# Patient Record
Sex: MALE | Race: BLACK OR AFRICAN AMERICAN | NOT HISPANIC OR LATINO
[De-identification: names, ages, dates, MRNs, and addresses within clinical notes are randomized per-mention and may not be internally consistent; named-entity substitution may affect disease eponyms.]

---

## 2017-02-13 PROBLEM — Z00.00 ENCOUNTER FOR PREVENTIVE HEALTH EXAMINATION: Status: ACTIVE | Noted: 2017-02-13

## 2017-03-02 ENCOUNTER — APPOINTMENT (OUTPATIENT)
Dept: ENDOCRINOLOGY | Facility: CLINIC | Age: 62
End: 2017-03-02

## 2017-03-02 PROBLEM — Z00.00 ENCOUNTER FOR PREVENTIVE HEALTH EXAMINATION: Status: ACTIVE | Noted: 2017-03-02

## 2017-03-06 ENCOUNTER — APPOINTMENT (OUTPATIENT)
Age: 62
End: 2017-03-06

## 2017-03-07 ENCOUNTER — APPOINTMENT (OUTPATIENT)
Dept: NEPHROLOGY | Facility: CLINIC | Age: 62
End: 2017-03-07

## 2017-03-07 VITALS
HEART RATE: 56 BPM | WEIGHT: 187 LBS | HEIGHT: 70 IN | SYSTOLIC BLOOD PRESSURE: 98 MMHG | BODY MASS INDEX: 26.77 KG/M2 | DIASTOLIC BLOOD PRESSURE: 46 MMHG

## 2017-03-21 ENCOUNTER — APPOINTMENT (OUTPATIENT)
Dept: INTERNAL MEDICINE | Facility: CLINIC | Age: 62
End: 2017-03-21

## 2017-04-11 ENCOUNTER — OUTPATIENT (OUTPATIENT)
Dept: OUTPATIENT SERVICES | Facility: HOSPITAL | Age: 62
LOS: 1 days | Discharge: HOME | End: 2017-04-11

## 2017-05-18 ENCOUNTER — APPOINTMENT (OUTPATIENT)
Dept: PODIATRY | Facility: CLINIC | Age: 62
End: 2017-05-18

## 2017-05-19 ENCOUNTER — APPOINTMENT (OUTPATIENT)
Dept: INTERNAL MEDICINE | Facility: CLINIC | Age: 62
End: 2017-05-19

## 2017-06-07 ENCOUNTER — EMERGENCY (EMERGENCY)
Facility: HOSPITAL | Age: 62
LOS: 0 days | Discharge: HOME | End: 2017-06-07
Admitting: GENERAL PRACTICE

## 2017-06-07 DIAGNOSIS — I10 ESSENTIAL (PRIMARY) HYPERTENSION: ICD-10-CM

## 2017-06-08 ENCOUNTER — CHART COPY (OUTPATIENT)
Age: 62
End: 2017-06-08

## 2017-06-09 ENCOUNTER — INPATIENT (INPATIENT)
Facility: HOSPITAL | Age: 62
LOS: 4 days | Discharge: HOME | End: 2017-06-14
Attending: HOSPITALIST | Admitting: GENERAL PRACTICE

## 2017-06-09 ENCOUNTER — CHART COPY (OUTPATIENT)
Age: 62
End: 2017-06-09

## 2017-06-09 DIAGNOSIS — I16.0 HYPERTENSIVE URGENCY: ICD-10-CM

## 2017-06-09 DIAGNOSIS — N19 UNSPECIFIED KIDNEY FAILURE: ICD-10-CM

## 2017-06-09 DIAGNOSIS — R53.1 WEAKNESS: ICD-10-CM

## 2017-06-13 ENCOUNTER — APPOINTMENT (OUTPATIENT)
Dept: INTERNAL MEDICINE | Facility: CLINIC | Age: 62
End: 2017-06-13

## 2017-06-22 ENCOUNTER — INPATIENT (INPATIENT)
Facility: HOSPITAL | Age: 62
LOS: 0 days | Discharge: LEFT BEFORE DISCHARGE | End: 2017-06-23
Attending: HOSPITALIST | Admitting: GENERAL PRACTICE

## 2017-06-22 DIAGNOSIS — I16.0 HYPERTENSIVE URGENCY: ICD-10-CM

## 2017-06-22 DIAGNOSIS — R53.1 WEAKNESS: ICD-10-CM

## 2017-06-22 DIAGNOSIS — N19 UNSPECIFIED KIDNEY FAILURE: ICD-10-CM

## 2017-06-28 DIAGNOSIS — N18.6 END STAGE RENAL DISEASE: ICD-10-CM

## 2017-06-28 DIAGNOSIS — B19.20 UNSPECIFIED VIRAL HEPATITIS C WITHOUT HEPATIC COMA: ICD-10-CM

## 2017-06-28 DIAGNOSIS — E11.43 TYPE 2 DIABETES MELLITUS WITH DIABETIC AUTONOMIC (POLY)NEUROPATHY: ICD-10-CM

## 2017-06-28 DIAGNOSIS — T46.5X6A UNDERDOSING OF OTHER ANTIHYPERTENSIVE DRUGS, INITIAL ENCOUNTER: ICD-10-CM

## 2017-06-28 DIAGNOSIS — K31.84 GASTROPARESIS: ICD-10-CM

## 2017-06-28 DIAGNOSIS — Z99.2 DEPENDENCE ON RENAL DIALYSIS: ICD-10-CM

## 2017-06-28 DIAGNOSIS — E11.22 TYPE 2 DIABETES MELLITUS WITH DIABETIC CHRONIC KIDNEY DISEASE: ICD-10-CM

## 2017-06-28 DIAGNOSIS — I16.0 HYPERTENSIVE URGENCY: ICD-10-CM

## 2017-06-28 DIAGNOSIS — Z79.4 LONG TERM (CURRENT) USE OF INSULIN: ICD-10-CM

## 2017-06-28 DIAGNOSIS — E87.5 HYPERKALEMIA: ICD-10-CM

## 2017-06-28 DIAGNOSIS — D63.1 ANEMIA IN CHRONIC KIDNEY DISEASE: ICD-10-CM

## 2017-06-28 DIAGNOSIS — F17.200 NICOTINE DEPENDENCE, UNSPECIFIED, UNCOMPLICATED: ICD-10-CM

## 2017-06-28 DIAGNOSIS — R51 HEADACHE: ICD-10-CM

## 2017-06-28 DIAGNOSIS — B18.2 CHRONIC VIRAL HEPATITIS C: ICD-10-CM

## 2017-06-28 DIAGNOSIS — E11.65 TYPE 2 DIABETES MELLITUS WITH HYPERGLYCEMIA: ICD-10-CM

## 2017-06-28 DIAGNOSIS — I45.81 LONG QT SYNDROME: ICD-10-CM

## 2017-06-28 DIAGNOSIS — F11.20 OPIOID DEPENDENCE, UNCOMPLICATED: ICD-10-CM

## 2017-06-28 DIAGNOSIS — Z85.038 PERSONAL HISTORY OF OTHER MALIGNANT NEOPLASM OF LARGE INTESTINE: ICD-10-CM

## 2017-06-28 DIAGNOSIS — I12.0 HYPERTENSIVE CHRONIC KIDNEY DISEASE WITH STAGE 5 CHRONIC KIDNEY DISEASE OR END STAGE RENAL DISEASE: ICD-10-CM

## 2017-06-28 DIAGNOSIS — R06.02 SHORTNESS OF BREATH: ICD-10-CM

## 2017-06-28 DIAGNOSIS — E87.79 OTHER FLUID OVERLOAD: ICD-10-CM

## 2017-06-28 DIAGNOSIS — Z91.138 PATIENT'S UNINTENTIONAL UNDERDOSING OF MEDICATION REGIMEN FOR OTHER REASON: ICD-10-CM

## 2017-07-03 DIAGNOSIS — E87.5 HYPERKALEMIA: ICD-10-CM

## 2017-08-03 ENCOUNTER — OUTPATIENT (OUTPATIENT)
Dept: OUTPATIENT SERVICES | Facility: HOSPITAL | Age: 62
LOS: 1 days | Discharge: HOME | End: 2017-08-03

## 2017-08-03 DIAGNOSIS — R53.1 WEAKNESS: ICD-10-CM

## 2017-08-03 DIAGNOSIS — Z00.8 ENCOUNTER FOR OTHER GENERAL EXAMINATION: ICD-10-CM

## 2017-08-03 DIAGNOSIS — I49.9 CARDIAC ARRHYTHMIA, UNSPECIFIED: ICD-10-CM

## 2017-08-03 DIAGNOSIS — N19 UNSPECIFIED KIDNEY FAILURE: ICD-10-CM

## 2017-08-03 DIAGNOSIS — I16.0 HYPERTENSIVE URGENCY: ICD-10-CM

## 2017-08-04 ENCOUNTER — APPOINTMENT (OUTPATIENT)
Dept: INTERNAL MEDICINE | Facility: CLINIC | Age: 62
End: 2017-08-04

## 2017-08-28 DIAGNOSIS — Z01.818 ENCOUNTER FOR OTHER PREPROCEDURAL EXAMINATION: ICD-10-CM

## 2017-08-28 DIAGNOSIS — N18.6 END STAGE RENAL DISEASE: ICD-10-CM

## 2017-09-21 ENCOUNTER — OUTPATIENT (OUTPATIENT)
Dept: OUTPATIENT SERVICES | Facility: HOSPITAL | Age: 62
LOS: 1 days | Discharge: HOME | End: 2017-09-21

## 2017-09-21 DIAGNOSIS — N19 UNSPECIFIED KIDNEY FAILURE: ICD-10-CM

## 2017-09-21 DIAGNOSIS — R53.1 WEAKNESS: ICD-10-CM

## 2017-09-21 DIAGNOSIS — I16.0 HYPERTENSIVE URGENCY: ICD-10-CM

## 2017-09-28 DIAGNOSIS — R51 HEADACHE: ICD-10-CM

## 2018-01-02 ENCOUNTER — OUTPATIENT (OUTPATIENT)
Dept: OUTPATIENT SERVICES | Facility: HOSPITAL | Age: 63
LOS: 1 days | Discharge: HOME | End: 2018-01-02

## 2018-01-02 DIAGNOSIS — N18.6 END STAGE RENAL DISEASE: ICD-10-CM

## 2018-01-02 DIAGNOSIS — N19 UNSPECIFIED KIDNEY FAILURE: ICD-10-CM

## 2018-01-02 DIAGNOSIS — I16.0 HYPERTENSIVE URGENCY: ICD-10-CM

## 2018-01-02 DIAGNOSIS — R53.1 WEAKNESS: ICD-10-CM

## 2018-01-16 ENCOUNTER — OUTPATIENT (OUTPATIENT)
Dept: OUTPATIENT SERVICES | Facility: HOSPITAL | Age: 63
LOS: 1 days | Discharge: HOME | End: 2018-01-16

## 2018-01-16 DIAGNOSIS — I16.0 HYPERTENSIVE URGENCY: ICD-10-CM

## 2018-01-16 DIAGNOSIS — N18.6 END STAGE RENAL DISEASE: ICD-10-CM

## 2018-01-16 DIAGNOSIS — R53.1 WEAKNESS: ICD-10-CM

## 2018-01-16 DIAGNOSIS — N19 UNSPECIFIED KIDNEY FAILURE: ICD-10-CM

## 2018-01-18 ENCOUNTER — OUTPATIENT (OUTPATIENT)
Dept: OUTPATIENT SERVICES | Facility: HOSPITAL | Age: 63
LOS: 1 days | Discharge: HOME | End: 2018-01-18

## 2018-01-18 DIAGNOSIS — R53.1 WEAKNESS: ICD-10-CM

## 2018-01-18 DIAGNOSIS — I16.0 HYPERTENSIVE URGENCY: ICD-10-CM

## 2018-01-18 DIAGNOSIS — N19 UNSPECIFIED KIDNEY FAILURE: ICD-10-CM

## 2018-01-19 DIAGNOSIS — D64.9 ANEMIA, UNSPECIFIED: ICD-10-CM

## 2018-01-19 DIAGNOSIS — D63.1 ANEMIA IN CHRONIC KIDNEY DISEASE: ICD-10-CM

## 2018-02-12 DIAGNOSIS — Z87.891 PERSONAL HISTORY OF NICOTINE DEPENDENCE: ICD-10-CM

## 2018-02-12 DIAGNOSIS — N18.6 END STAGE RENAL DISEASE: ICD-10-CM

## 2018-02-12 DIAGNOSIS — Z79.899 OTHER LONG TERM (CURRENT) DRUG THERAPY: ICD-10-CM

## 2018-02-12 DIAGNOSIS — I12.0 HYPERTENSIVE CHRONIC KIDNEY DISEASE WITH STAGE 5 CHRONIC KIDNEY DISEASE OR END STAGE RENAL DISEASE: ICD-10-CM

## 2018-02-12 DIAGNOSIS — Z79.84 LONG TERM (CURRENT) USE OF ORAL HYPOGLYCEMIC DRUGS: ICD-10-CM

## 2018-02-12 DIAGNOSIS — Z88.0 ALLERGY STATUS TO PENICILLIN: ICD-10-CM

## 2018-02-12 DIAGNOSIS — Z99.2 DEPENDENCE ON RENAL DIALYSIS: ICD-10-CM

## 2018-02-12 DIAGNOSIS — R11.10 VOMITING, UNSPECIFIED: ICD-10-CM

## 2018-02-12 DIAGNOSIS — I10 ESSENTIAL (PRIMARY) HYPERTENSION: ICD-10-CM

## 2018-03-27 ENCOUNTER — OUTPATIENT (OUTPATIENT)
Dept: OUTPATIENT SERVICES | Facility: HOSPITAL | Age: 63
LOS: 1 days | Discharge: HOME | End: 2018-03-27

## 2018-03-27 DIAGNOSIS — D64.9 ANEMIA, UNSPECIFIED: ICD-10-CM

## 2018-05-30 ENCOUNTER — LABORATORY RESULT (OUTPATIENT)
Age: 63
End: 2018-05-30

## 2018-05-30 ENCOUNTER — APPOINTMENT (OUTPATIENT)
Dept: HEMATOLOGY ONCOLOGY | Facility: CLINIC | Age: 63
End: 2018-05-30

## 2018-05-30 VITALS
HEART RATE: 58 BPM | RESPIRATION RATE: 14 BRPM | SYSTOLIC BLOOD PRESSURE: 140 MMHG | DIASTOLIC BLOOD PRESSURE: 82 MMHG | HEIGHT: 70 IN | TEMPERATURE: 97.6 F

## 2018-05-30 DIAGNOSIS — Z99.2 END STAGE RENAL DISEASE: ICD-10-CM

## 2018-05-30 DIAGNOSIS — E78.5 HYPERLIPIDEMIA, UNSPECIFIED: ICD-10-CM

## 2018-05-30 DIAGNOSIS — F17.200 NICOTINE DEPENDENCE, UNSPECIFIED, UNCOMPLICATED: ICD-10-CM

## 2018-05-30 DIAGNOSIS — N18.6 END STAGE RENAL DISEASE: ICD-10-CM

## 2018-05-30 DIAGNOSIS — E11.9 TYPE 2 DIABETES MELLITUS W/OUT COMPLICATIONS: ICD-10-CM

## 2018-05-30 DIAGNOSIS — D64.9 ANEMIA, UNSPECIFIED: ICD-10-CM

## 2018-05-30 DIAGNOSIS — I10 ESSENTIAL (PRIMARY) HYPERTENSION: ICD-10-CM

## 2018-05-31 LAB
ALBUMIN SERPL ELPH-MCNC: 2.9 G/DL
ALP BLD-CCNC: 53 U/L
ALT SERPL-CCNC: 11 U/L
ANION GAP SERPL CALC-SCNC: 13 MMOL/L
AST SERPL-CCNC: 19 U/L
BILIRUB SERPL-MCNC: 0.3 MG/DL
BUN SERPL-MCNC: 27 MG/DL
CALCIUM SERPL-MCNC: 8.1 MG/DL
CEA SERPL-MCNC: 4.4 NG/ML
CHLORIDE SERPL-SCNC: 97 MMOL/L
CO2 SERPL-SCNC: 27 MMOL/L
CREAT SERPL-MCNC: 3.1 MG/DL
FERRITIN SERPL-MCNC: 1352 NG/ML
FOLATE SERPL-MCNC: 4.2 NG/ML
GLUCOSE SERPL-MCNC: 73 MG/DL
HAPTOGLOB SERPL-MCNC: 158 MG/DL
HCT VFR BLD CALC: 24.8 %
HGB BLD-MCNC: 7.4 G/DL
IRON SATN MFR SERPL: 21 %
IRON SERPL-MCNC: 32 UG/DL
LDH SERPL-CCNC: 233 U/L
MCHC RBC-ENTMCNC: 27.6 PG
MCHC RBC-ENTMCNC: 29.8 G/DL
MCV RBC AUTO: 92.5 FL
PLATELET # BLD AUTO: 195 K/UL
PMV BLD: 10 FL
POTASSIUM SERPL-SCNC: 4.5 MMOL/L
PROT SERPL-MCNC: 7.7 G/DL
RBC # BLD: 2.68 M/UL
RBC # FLD: 17.2 %
SODIUM SERPL-SCNC: 137 MMOL/L
TIBC SERPL-MCNC: 151 UG/DL
UIBC SERPL-MCNC: 119 UG/DL
VIT B12 SERPL-MCNC: 315 PG/ML
WBC # FLD AUTO: 6.9 K/UL

## 2018-06-04 PROBLEM — I10 HTN (HYPERTENSION): Status: ACTIVE | Noted: 2018-06-04

## 2018-06-04 PROBLEM — E78.5 DYSLIPIDEMIA: Status: ACTIVE | Noted: 2018-06-04

## 2018-06-04 PROBLEM — N18.6 END-STAGE RENAL DISEASE ON HEMODIALYSIS: Status: ACTIVE | Noted: 2018-06-04

## 2018-06-04 PROBLEM — E11.9 DIABETES MELLITUS: Status: ACTIVE | Noted: 2018-06-04

## 2018-06-04 RX ORDER — CHOLESTYRAMINE 4 G/9G
4 POWDER, FOR SUSPENSION ORAL
Refills: 0 | Status: ACTIVE | COMMUNITY

## 2018-06-04 RX ORDER — IPRATROPIUM BROMIDE AND ALBUTEROL SULFATE 2.5; .5 MG/3ML; MG/3ML
0.5-2.5 (3) SOLUTION RESPIRATORY (INHALATION)
Refills: 0 | Status: ACTIVE | COMMUNITY

## 2018-06-04 RX ORDER — OXYCODONE 5 MG/1
5 TABLET ORAL
Refills: 0 | Status: ACTIVE | COMMUNITY

## 2018-06-04 RX ORDER — CALCIUM ACETATE 667 MG/1
667 CAPSULE ORAL
Refills: 0 | Status: ACTIVE | COMMUNITY

## 2018-06-04 RX ORDER — DEXTROMETHORPHAN HBR AND GUAIFENESIN 10; 100 MG/5ML; MG/5ML
10-100 SOLUTION ORAL
Refills: 0 | Status: ACTIVE | COMMUNITY

## 2018-06-04 RX ORDER — LABETALOL HYDROCHLORIDE 200 MG/1
200 TABLET, FILM COATED ORAL
Refills: 0 | Status: ACTIVE | COMMUNITY

## 2018-06-04 RX ORDER — ACETAMINOPHEN 325 MG/1
325 TABLET ORAL
Refills: 0 | Status: ACTIVE | COMMUNITY

## 2018-06-04 RX ORDER — LACTULOSE 10 G/15ML
10 SOLUTION ORAL
Refills: 0 | Status: ACTIVE | COMMUNITY

## 2018-06-04 RX ORDER — ASPIRIN 81 MG/1
81 TABLET, COATED ORAL
Refills: 0 | Status: ACTIVE | COMMUNITY

## 2018-06-04 RX ORDER — METHADONE HYDROCHLORIDE 5 MG/5ML
5 SOLUTION ORAL
Refills: 0 | Status: ACTIVE | COMMUNITY

## 2018-06-04 RX ORDER — TAMSULOSIN HYDROCHLORIDE 0.4 MG/1
0.4 CAPSULE ORAL
Refills: 0 | Status: ACTIVE | COMMUNITY

## 2018-06-04 RX ORDER — HYDRALAZINE HYDROCHLORIDE 100 MG/1
100 TABLET ORAL
Refills: 0 | Status: ACTIVE | COMMUNITY

## 2018-06-04 RX ORDER — NYSTATIN 100000 [USP'U]/G
100000 CREAM TOPICAL
Refills: 0 | Status: ACTIVE | COMMUNITY

## 2018-06-04 RX ORDER — LISINOPRIL 20 MG/1
20 TABLET ORAL
Refills: 0 | Status: ACTIVE | COMMUNITY

## 2018-06-04 RX ORDER — FINASTERIDE 1 MG/1
1 TABLET ORAL
Refills: 0 | Status: ACTIVE | COMMUNITY

## 2018-06-08 LAB
EPO SERPL-MCNC: 632.4 MIU/ML
METHYLMALONATE SERPL-SCNC: 650 NMOL/L

## 2018-06-11 ENCOUNTER — OUTPATIENT (OUTPATIENT)
Dept: OUTPATIENT SERVICES | Facility: HOSPITAL | Age: 63
LOS: 1 days | Discharge: HOME | End: 2018-06-11

## 2018-06-11 DIAGNOSIS — A49.8 OTHER BACTERIAL INFECTIONS OF UNSPECIFIED SITE: ICD-10-CM

## 2018-06-25 ENCOUNTER — OUTPATIENT (OUTPATIENT)
Dept: OUTPATIENT SERVICES | Facility: HOSPITAL | Age: 63
LOS: 1 days | Discharge: HOME | End: 2018-06-25

## 2018-06-25 DIAGNOSIS — A49.8 OTHER BACTERIAL INFECTIONS OF UNSPECIFIED SITE: ICD-10-CM

## 2018-07-09 ENCOUNTER — OUTPATIENT (OUTPATIENT)
Dept: OUTPATIENT SERVICES | Facility: HOSPITAL | Age: 63
LOS: 1 days | Discharge: HOME | End: 2018-07-09

## 2018-07-09 DIAGNOSIS — A49.8 OTHER BACTERIAL INFECTIONS OF UNSPECIFIED SITE: ICD-10-CM

## 2018-07-13 ENCOUNTER — APPOINTMENT (OUTPATIENT)
Dept: HEMATOLOGY ONCOLOGY | Facility: CLINIC | Age: 63
End: 2018-07-13

## 2018-07-13 ENCOUNTER — LABORATORY RESULT (OUTPATIENT)
Age: 63
End: 2018-07-13

## 2018-07-13 VITALS
DIASTOLIC BLOOD PRESSURE: 62 MMHG | SYSTOLIC BLOOD PRESSURE: 132 MMHG | WEIGHT: 187 LBS | HEART RATE: 61 BPM | RESPIRATION RATE: 14 BRPM | BODY MASS INDEX: 26.77 KG/M2 | HEIGHT: 70 IN | TEMPERATURE: 98.6 F

## 2018-07-16 ENCOUNTER — OUTPATIENT (OUTPATIENT)
Dept: OUTPATIENT SERVICES | Facility: HOSPITAL | Age: 63
LOS: 1 days | Discharge: HOME | End: 2018-07-16

## 2018-07-16 ENCOUNTER — APPOINTMENT (OUTPATIENT)
Dept: INFUSION THERAPY | Facility: CLINIC | Age: 63
End: 2018-07-16

## 2018-07-16 ENCOUNTER — LABORATORY RESULT (OUTPATIENT)
Age: 63
End: 2018-07-16

## 2018-07-16 DIAGNOSIS — D64.9 ANEMIA, UNSPECIFIED: ICD-10-CM

## 2018-07-19 LAB
HCT VFR BLD CALC: 28.2 %
HCT VFR BLD CALC: 29.4 %
HGB BLD-MCNC: 8.5 G/DL
HGB BLD-MCNC: 8.9 G/DL
MCHC RBC-ENTMCNC: 28.2 PG
MCHC RBC-ENTMCNC: 28.5 PG
MCHC RBC-ENTMCNC: 30.1 G/DL
MCHC RBC-ENTMCNC: 30.3 G/DL
MCV RBC AUTO: 93.7 FL
MCV RBC AUTO: 94.2 FL
PLATELET # BLD AUTO: 196 K/UL
PLATELET # BLD AUTO: 197 K/UL
PMV BLD: 10.3 FL
PMV BLD: 9.2 FL
RBC # BLD: 3.01 M/UL
RBC # BLD: 3.12 M/UL
RBC # FLD: 15.8 %
RBC # FLD: 16.3 %
WBC # FLD AUTO: 6.1 K/UL
WBC # FLD AUTO: 6.24 K/UL

## 2018-07-23 ENCOUNTER — OUTPATIENT (OUTPATIENT)
Dept: OUTPATIENT SERVICES | Facility: HOSPITAL | Age: 63
LOS: 1 days | Discharge: HOME | End: 2018-07-23

## 2018-07-23 ENCOUNTER — APPOINTMENT (OUTPATIENT)
Dept: HEMATOLOGY ONCOLOGY | Facility: CLINIC | Age: 63
End: 2018-07-23

## 2018-07-23 DIAGNOSIS — A49.8 OTHER BACTERIAL INFECTIONS OF UNSPECIFIED SITE: ICD-10-CM

## 2018-07-28 ENCOUNTER — EMERGENCY (EMERGENCY)
Facility: HOSPITAL | Age: 63
LOS: 0 days | Discharge: NURSING, REHAB OR LONG-TERM CARE | End: 2018-07-28
Attending: EMERGENCY MEDICINE | Admitting: EMERGENCY MEDICINE

## 2018-07-28 VITALS
TEMPERATURE: 98 F | RESPIRATION RATE: 19 BRPM | DIASTOLIC BLOOD PRESSURE: 72 MMHG | HEIGHT: 70 IN | WEIGHT: 130.07 LBS | OXYGEN SATURATION: 98 % | SYSTOLIC BLOOD PRESSURE: 126 MMHG | HEART RATE: 60 BPM

## 2018-07-28 VITALS
OXYGEN SATURATION: 98 % | TEMPERATURE: 98 F | RESPIRATION RATE: 20 BRPM | SYSTOLIC BLOOD PRESSURE: 140 MMHG | DIASTOLIC BLOOD PRESSURE: 70 MMHG | HEART RATE: 68 BPM

## 2018-07-28 DIAGNOSIS — E11.9 TYPE 2 DIABETES MELLITUS WITHOUT COMPLICATIONS: ICD-10-CM

## 2018-07-28 DIAGNOSIS — I12.0 HYPERTENSIVE CHRONIC KIDNEY DISEASE WITH STAGE 5 CHRONIC KIDNEY DISEASE OR END STAGE RENAL DISEASE: ICD-10-CM

## 2018-07-28 DIAGNOSIS — N47.2 PARAPHIMOSIS: ICD-10-CM

## 2018-07-28 DIAGNOSIS — J44.9 CHRONIC OBSTRUCTIVE PULMONARY DISEASE, UNSPECIFIED: ICD-10-CM

## 2018-07-28 DIAGNOSIS — Z79.891 LONG TERM (CURRENT) USE OF OPIATE ANALGESIC: ICD-10-CM

## 2018-07-28 DIAGNOSIS — N48.89 OTHER SPECIFIED DISORDERS OF PENIS: ICD-10-CM

## 2018-07-28 DIAGNOSIS — N18.6 END STAGE RENAL DISEASE: ICD-10-CM

## 2018-07-28 DIAGNOSIS — Z79.899 OTHER LONG TERM (CURRENT) DRUG THERAPY: ICD-10-CM

## 2018-07-28 DIAGNOSIS — R11.10 VOMITING, UNSPECIFIED: ICD-10-CM

## 2018-07-28 DIAGNOSIS — R19.7 DIARRHEA, UNSPECIFIED: ICD-10-CM

## 2018-07-28 DIAGNOSIS — Z79.51 LONG TERM (CURRENT) USE OF INHALED STEROIDS: ICD-10-CM

## 2018-07-28 DIAGNOSIS — Z88.0 ALLERGY STATUS TO PENICILLIN: ICD-10-CM

## 2018-07-28 LAB
ANION GAP SERPL CALC-SCNC: 15 MMOL/L — HIGH (ref 7–14)
BASOPHILS # BLD AUTO: 0.02 K/UL — SIGNIFICANT CHANGE UP (ref 0–0.2)
BASOPHILS NFR BLD AUTO: 0.2 % — SIGNIFICANT CHANGE UP (ref 0–1)
BUN SERPL-MCNC: 81 MG/DL — CRITICAL HIGH (ref 10–20)
CALCIUM SERPL-MCNC: 8.8 MG/DL — SIGNIFICANT CHANGE UP (ref 8.5–10.1)
CHLORIDE SERPL-SCNC: 95 MMOL/L — LOW (ref 98–110)
CO2 SERPL-SCNC: 26 MMOL/L — SIGNIFICANT CHANGE UP (ref 17–32)
CREAT SERPL-MCNC: 5.8 MG/DL — CRITICAL HIGH (ref 0.7–1.5)
EOSINOPHIL # BLD AUTO: 0.12 K/UL — SIGNIFICANT CHANGE UP (ref 0–0.7)
EOSINOPHIL NFR BLD AUTO: 1.3 % — SIGNIFICANT CHANGE UP (ref 0–8)
GLUCOSE SERPL-MCNC: 97 MG/DL — SIGNIFICANT CHANGE UP (ref 70–99)
HCT VFR BLD CALC: 25.7 % — LOW (ref 42–52)
HGB BLD-MCNC: 8 G/DL — LOW (ref 14–18)
IMM GRANULOCYTES NFR BLD AUTO: 0.5 % — HIGH (ref 0.1–0.3)
LYMPHOCYTES # BLD AUTO: 0.89 K/UL — LOW (ref 1.2–3.4)
LYMPHOCYTES # BLD AUTO: 9.7 % — LOW (ref 20.5–51.1)
MCHC RBC-ENTMCNC: 29 PG — SIGNIFICANT CHANGE UP (ref 27–31)
MCHC RBC-ENTMCNC: 31.1 G/DL — LOW (ref 32–37)
MCV RBC AUTO: 93.1 FL — SIGNIFICANT CHANGE UP (ref 80–94)
MONOCYTES # BLD AUTO: 1.27 K/UL — HIGH (ref 0.1–0.6)
MONOCYTES NFR BLD AUTO: 13.8 % — HIGH (ref 1.7–9.3)
NEUTROPHILS # BLD AUTO: 6.84 K/UL — HIGH (ref 1.4–6.5)
NEUTROPHILS NFR BLD AUTO: 74.5 % — SIGNIFICANT CHANGE UP (ref 42.2–75.2)
PLATELET # BLD AUTO: 126 K/UL — LOW (ref 130–400)
POTASSIUM SERPL-MCNC: 4.8 MMOL/L — SIGNIFICANT CHANGE UP (ref 3.5–5)
POTASSIUM SERPL-SCNC: 4.8 MMOL/L — SIGNIFICANT CHANGE UP (ref 3.5–5)
RBC # BLD: 2.76 M/UL — LOW (ref 4.7–6.1)
RBC # FLD: 16.8 % — HIGH (ref 11.5–14.5)
SODIUM SERPL-SCNC: 136 MMOL/L — SIGNIFICANT CHANGE UP (ref 135–146)
WBC # BLD: 9.19 K/UL — SIGNIFICANT CHANGE UP (ref 4.8–10.8)
WBC # FLD AUTO: 9.19 K/UL — SIGNIFICANT CHANGE UP (ref 4.8–10.8)

## 2018-07-28 RX ORDER — IPRATROPIUM/ALBUTEROL SULFATE 18-103MCG
3 AEROSOL WITH ADAPTER (GRAM) INHALATION
Qty: 0 | Refills: 0 | COMMUNITY

## 2018-07-28 RX ORDER — METHADONE HYDROCHLORIDE 40 MG/1
0 TABLET ORAL
Qty: 0 | Refills: 0 | COMMUNITY

## 2018-07-28 RX ORDER — LISINOPRIL 2.5 MG/1
1 TABLET ORAL
Qty: 0 | Refills: 0 | COMMUNITY

## 2018-07-28 NOTE — ED PROVIDER NOTE - OBJECTIVE STATEMENT
63 y/o male with pmhx of ESRD on HD TThSa, COPD, DM presents from [facility], [place], operated by [facility] for evaluation of paraphimosis. Patient states he has a chronic indwelling watson, last changed 3 days ago; however he noticed that his penis has been swollen for 1 week. Patient is a poor historian, unable to give details. Patient also admits to diarrhea for several days with vomiting 2 days ago. Patient denied dialysis today because he wanted to take care of his penis swelling. Denies fevers/chills, SOB, chest pain, abdominal pain.

## 2018-07-28 NOTE — ED PROVIDER NOTE - MEDICAL DECISION MAKING DETAILS
62y male above PMH with penile pain as above s/p watson change, no fever, missed HD to have this attended to (makes urine, occasionally misses dialysis without ill effect), on exam has paraphimosis with area of skin breakdown/lac to dorsum at insertion of foreskin to corona, no warmth/erythema, no discharge,  consulted, mostly reduced by DARLING Vega (looks like dorsal aspect chronically retracted), watson in place draining clear urine, wet to dry applied to skin breakdown, will d/c on abx to f/u with pmd/ as outpatient. Patient counseled regarding conditions which should prompt return.

## 2018-07-28 NOTE — ED PROVIDER NOTE - PROGRESS NOTE DETAILS
Urology PA at bedside, reduced paraphimosis almost reduced circumferentially however there is a slit on the dorsal surface of the penis under the glans with no excess foreskin available for reduction; wet to dry dressing placed, patient understands appropriate care and understands to make sure foreskin is pulled over after watson insertion in the future.

## 2018-07-28 NOTE — CONSULT NOTE ADULT - SUBJECTIVE AND OBJECTIVE BOX
61 y/o male with pmhx of ESRD on HD (TThSa), COPD, DM presents from [facility] for evaluation of paraphimosis.  Patient states he has a chronic indwelling watson because still makes urine & last changed 3 days ago. Patient states the nursing a rehab center change watson about once a month.  Patient reports he noticed that his penis has been swollen for 1 week. Patient is a poor historian, unable to give exact details. Reports pain with palpation.     Patient also admits to diarrhea for several days with vomiting 2 days ago. Patient denied dialysis today because he wanted to take care of his penis swelling.    Denies fevers/chills, SOB, chest pain, abdominal pain.    Denies hematuria, dysuria, frequency or burning.  Reports good output with Watson.	              PAST MEDICAL/SURGICAL/FAMILY/SOCIAL HISTORY:    Past Medical History:  AV fistula    Clostridium difficile diarrhea    COPD (chronic obstructive pulmonary disease)    DM (diabetes mellitus)    ESRD (end stage renal disease)    Chronic Watson catheter in place    Foot amputation status, right    Gas gangrene    HTN (hypertension).        Social history:  Past smoker but denies for years, Denies ETOH or Drug use.        Allergies:        Allergies:  	penicillins: Drug Category, Other, Originally Entered as [TACHYCARDIA] reaction to [Penicillins]        Home Medications:   * Patient Currently Takes Medications as of 28-Jul-2018 15:30 documented in Structured Notes  · 	lisinopril 20 mg oral tablet: Last Dose Taken:  , 1 tab(s) orally once a day  · 	methadone 5 mg/5 mL oral solution: Last Dose Taken:  , orally once a day  · 	ipratropium-albuterol 0.5 mg-2.5 mg/3 mLinhalation solution: Last Dose Taken:  , 3 milliliter(s) inhaled 4 times a day  · 	Percocet 5/325 oral tablet: Last Dose Taken:  , 1 tab(s) orally every 6 hours        REVIEW OF SYSTEMS:   As per HPI otherwise unremarkable.      Vital Signs Last 24 Hrs  T(C): 36.4 (28 Jul 2018 17:30), Max: 36.5 (28 Jul 2018 15:20)  T(F): 97.6 (28 Jul 2018 17:30), Max: 97.7 (28 Jul 2018 15:20)  HR: 66 (28 Jul 2018 17:30) (60 - 66)  BP: 137/74 (28 Jul 2018 17:30) (126/72 - 137/74)  BP(mean): --  RR: 20 (28 Jul 2018 17:30) (19 - 20)  SpO2: 98% (28 Jul 2018 17:30) (98% - 98%)        PHYSICAL EXAM:    General: Conversant in NAD.    Back: no spinal tenderness.     Respiratory: CTA B/L.    Cardiovascular:  S1 & S2, RRR.    Gastrointestinal: + BS, soft, no distention, non-tender, no rebound or guarding or peritoneal signs.    : uncircumcised,  Watson in place, + paraphimosis with edema of foreskin (posterior > anterior), tender with palpation,  anterior transverse chronic appearing laceration at base of glans, no active bleeding, discharge or drainage,  No necrosis noted to penis or foreskin.      Extremities: Free painless ROM, no C/C/E, no calf tenderness. + AV fistula.    Neurological: No focal deficits.       Labs:                          8.0    9.19  )-----------( 126      ( 28 Jul 2018 14:00 )             25.7         136  |  95<L>  |  81<HH>  ----------------------------<  97  4.8   |  26  |  5.8<HH>    Ca    8.8      28 Jul 2018 14:00

## 2018-07-28 NOTE — ED ADULT TRIAGE NOTE - CHIEF COMPLAINT QUOTE
BIBA from [facility] and [facility] as per paperwork from facility "C/o diarrhea, weakness, and scrotal pain". as per EMS "His blood pressure was low on arrival 105/72, right forearm 20 gauge and 500 cc of NS given

## 2018-07-28 NOTE — ED PROVIDER NOTE - PHYSICAL EXAMINATION
CONSTITUTIONAL: Well-developed; well-nourished; in no acute distress.   SKIN: warm, dry  HEAD: Normocephalic; atraumatic.  CARD: S1, S2 normal; no murmurs, gallops, or rubs. Regular rate and rhythm.   RESP: No wheezes, rales or rhonchi.  ABD: soft ntnd  : +paraphimosis with chronic appearing laceration on dorsal surface of penis at the base of glans; no discharge noted, watson in place, draining clear urine   NEURO: Alert, oriented, grossly unremarkable  PSYCH: Cooperative, appropriate.

## 2018-07-28 NOTE — ED PROVIDER NOTE - PMH
AV fistula    Clostridium difficile diarrhea    COPD (chronic obstructive pulmonary disease)    DM (diabetes mellitus)    ESRD (end stage renal disease)    Bell catheter in place    Foot amputation status, right    Gas gangrene    HTN (hypertension)

## 2018-07-28 NOTE — ED PROVIDER NOTE - NS ED ROS FT
Constitutional: See HPI.  Cardiac: No chest pain, SOB or edema. No chest pain with exertion.  Respiratory: No cough or respiratory distress.   GI: No nausea, vomiting, diarrhea or abdominal pain.  : +penis swelling; No dysuria, frequency or burning.  Neuro: No headache or weakness. No LOC.  Skin: No skin rash.

## 2018-07-28 NOTE — ED ADULT NURSE REASSESSMENT NOTE - NS ED NURSE REASSESS COMMENT FT1
Pt awaiting transport to [facility].

## 2018-07-28 NOTE — ED ADULT NURSE NOTE - CHIEF COMPLAINT QUOTE
BIBA from [facility] and [facility] as per paperwork from facility "C/o diarrhea, weakness, and scrotal pain". as per EMS "His blood pressure was low on arrival 105/72, right forearm 20 gauge and 500 cc of NS given

## 2018-07-28 NOTE — CONSULT NOTE ADULT - PROBLEM SELECTOR RECOMMENDATION 9
Plan:    - At bedside, I applied steady circumferential pressure with saline soaked gauze pad to swollen penis area for 10 minutes, then paraphimosis almost reduced circumferentially. However there is a slit on the dorsal surface of the penis under the glans with no excess foreskin available for reduction.     - Wet to dry dressing placed applied by ER resident.    - Explained instructions to patient and patient understands appropriate care and understands to make sure foreskin is pulled over after future Bell insertions.    - ER will give oral antibiotic prior to sending back to rehab center.    - Patient instructed to return to ER if Paraphimosis returns and do not wait a week like this episode.    - Case d/w Dr. Faith and agrees with above plan.

## 2018-07-28 NOTE — ED ADULT NURSE NOTE - PMH
AV fistula    Clostridium difficile diarrhea    COPD (chronic obstructive pulmonary disease)    ESRD (end stage renal disease)    Bell catheter in place    Foot amputation status, right    Gas gangrene    HTN (hypertension) AV fistula    Clostridium difficile diarrhea    COPD (chronic obstructive pulmonary disease)    DM (diabetes mellitus)    ESRD (end stage renal disease)    Bell catheter in place    Foot amputation status, right    Gas gangrene    HTN (hypertension)

## 2018-07-30 ENCOUNTER — OUTPATIENT (OUTPATIENT)
Dept: OUTPATIENT SERVICES | Facility: HOSPITAL | Age: 63
LOS: 1 days | Discharge: HOME | End: 2018-07-30

## 2018-07-30 DIAGNOSIS — D64.9 ANEMIA, UNSPECIFIED: ICD-10-CM

## 2018-07-31 PROBLEM — E11.9 TYPE 2 DIABETES MELLITUS WITHOUT COMPLICATIONS: Chronic | Status: ACTIVE | Noted: 2018-07-28

## 2018-07-31 PROBLEM — A48.0 GAS GANGRENE: Chronic | Status: ACTIVE | Noted: 2018-07-28

## 2018-07-31 PROBLEM — I77.0 ARTERIOVENOUS FISTULA, ACQUIRED: Chronic | Status: ACTIVE | Noted: 2018-07-28

## 2018-07-31 PROBLEM — Z89.431 ACQUIRED ABSENCE OF RIGHT FOOT: Chronic | Status: ACTIVE | Noted: 2018-07-28

## 2018-07-31 PROBLEM — I10 ESSENTIAL (PRIMARY) HYPERTENSION: Chronic | Status: ACTIVE | Noted: 2018-07-28

## 2018-07-31 PROBLEM — N18.6 END STAGE RENAL DISEASE: Chronic | Status: ACTIVE | Noted: 2018-07-28

## 2018-07-31 PROBLEM — A04.72 ENTEROCOLITIS DUE TO CLOSTRIDIUM DIFFICILE, NOT SPECIFIED AS RECURRENT: Chronic | Status: ACTIVE | Noted: 2018-07-28

## 2018-07-31 PROBLEM — J44.9 CHRONIC OBSTRUCTIVE PULMONARY DISEASE, UNSPECIFIED: Chronic | Status: ACTIVE | Noted: 2018-07-28

## 2018-07-31 PROBLEM — Z92.89 PERSONAL HISTORY OF OTHER MEDICAL TREATMENT: Chronic | Status: ACTIVE | Noted: 2018-07-28

## 2018-08-01 ENCOUNTER — EMERGENCY (EMERGENCY)
Facility: HOSPITAL | Age: 63
LOS: 0 days | Discharge: HOME | End: 2018-08-01
Attending: EMERGENCY MEDICINE | Admitting: EMERGENCY MEDICINE

## 2018-08-01 VITALS
SYSTOLIC BLOOD PRESSURE: 135 MMHG | RESPIRATION RATE: 18 BRPM | DIASTOLIC BLOOD PRESSURE: 67 MMHG | OXYGEN SATURATION: 98 % | HEART RATE: 80 BPM | TEMPERATURE: 98 F

## 2018-08-01 VITALS — WEIGHT: 182.98 LBS | HEIGHT: 71 IN

## 2018-08-01 DIAGNOSIS — M79.672 PAIN IN LEFT FOOT: ICD-10-CM
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
## 2018-08-01 DIAGNOSIS — Z79.899 OTHER LONG TERM (CURRENT) DRUG THERAPY: ICD-10-CM

## 2018-08-01 DIAGNOSIS — Z88.0 ALLERGY STATUS TO PENICILLIN: ICD-10-CM

## 2018-08-01 DIAGNOSIS — S90.812A ABRASION, LEFT FOOT, INITIAL ENCOUNTER: ICD-10-CM

## 2018-08-01 DIAGNOSIS — I12.0 HYPERTENSIVE CHRONIC KIDNEY DISEASE WITH STAGE 5 CHRONIC KIDNEY DISEASE OR END STAGE RENAL DISEASE: ICD-10-CM
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
## 2018-08-01 DIAGNOSIS — E11.9 TYPE 2 DIABETES MELLITUS WITHOUT COMPLICATIONS: ICD-10-CM

## 2018-08-01 DIAGNOSIS — N18.6 END STAGE RENAL DISEASE: ICD-10-CM
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
## 2018-08-01 DIAGNOSIS — J44.9 CHRONIC OBSTRUCTIVE PULMONARY DISEASE, UNSPECIFIED: ICD-10-CM

## 2018-08-01 NOTE — ED ADULT NURSE REASSESSMENT NOTE - NS ED NURSE REASSESS COMMENT FT1
Pt went home via ambulance. dsg intact to b/l feet. Supplies given for dsg changes. VNS set up for tomorrow per .

## 2018-08-01 NOTE — ED PROVIDER NOTE - OBJECTIVE STATEMENT
63 y/o M with PMH of ESRD, R BKA coming from home requesting change of foot bandages. Pt states he hit his foot on a door frame today, noticed blood and would like bandages changed. Usually ambulates with electric scooter. Pt was at nursing home for care of his foot but left for monetary reasons. Declines XR, lab work and admission for high suspicion of infection. Pt decline exam other than dressing change.

## 2018-08-01 NOTE — ED PROVIDER NOTE - PHYSICAL EXAMINATION
Pt declined exam other than dressing change.   On removal of dressing, foul smelling L foot with 2 cm abrasion over lateral aspect of L 1st toe. R BKA with bright red blood, granulation tissue appreciated, no purulence, both LE show high suspicion for infection such as dry gangrene.

## 2018-08-01 NOTE — ED ADULT NURSE NOTE - OBJECTIVE STATEMENT
Pt stated pt banged his left foot today while going to the bathroom, c/o b/l feet pain and bleeding. Pt stated pt got discharged from [facility], [place], operated by [facility] yesterday with homecare services. R 1st-5th toes amputations and Ulcers to L heel and 1st toe noted. Pt poor historian. Refuses to give informations, requesting only dsg changes.

## 2018-08-01 NOTE — ED ADULT NURSE REASSESSMENT NOTE - NS ED NURSE REASSESS COMMENT FT1
pt refused to take vital signs despite education. Pt agitated, cursing, and screaming. Asst manager and security at bedside. Awaiting for ambulance. Will cont to monitor.

## 2018-08-01 NOTE — ED PROVIDER NOTE - MEDICAL DECISION MAKING DETAILS
61 y/o M with PMH of ESRD and R BKA, had extensive discussion about admission to hospital for further eval of wounds and high concern for infection. Pt declined at this time, understands that if left untreated infection could result in significant bodily harm, disability, loss of limb  and even death. Discussed with social work to set up home care for pt for dress changes. Pt would require admission to set up services. Again offered pt admission which he is declining at this time. Pt feels comfortable changing own dress supplies given. Pt is AA&Ox3.

## 2018-08-01 NOTE — ED ADULT NURSE REASSESSMENT NOTE - NS ED NURSE REASSESS COMMENT FT1
b/l feet dsg changed by MD. Pt refuses any other medical care despite education other than dsg changes. MD aware. Spoke to  Meri, stating Pt still has VNS set up for tomorrow. Pt requesting to leave AMA. d/c instruction given and discussed with pt. Pt verbalized understanding. Tolerated his diet. Awaiting for ambulette. Will cont to monitor.

## 2018-08-01 NOTE — ED ADULT NURSE NOTE - NSIMPLEMENTINTERV_GEN_ALL_ED
Implemented All Fall with Harm Risk Interventions:  [place] to call system. Call bell, personal items and telephone within reach. Instruct patient to call for assistance. Room bathroom lighting operational. Non-slip footwear when patient is off stretcher. Physically safe environment: no spills, clutter or unnecessary equipment. Stretcher in lowest position, wheels locked, appropriate side rails in place. Provide visual cue, wrist band, yellow gown, etc. Monitor gait and stability. Monitor for mental status changes and reorient to person, place, and time. Review medications for side effects contributing to fall risk. Reinforce activity limits and safety measures with patient and family. Provide visual clues: red socks.

## 2018-08-01 NOTE — ED PROVIDER NOTE - NS ED ROS FT
No fever/chills, no change in vision, no throat pain, no chest pain, no no sob, abdominal pain, no nausea/vomiting,  +bl le pain and bleeding, +skin graft, no new focal numbness or weakness, no latex allergy

## 2019-12-01 NOTE — PHYSICAL EXAM
[Thin] : thin [Capable of only limited self care, confined to bed or chair more than 50% of waking hours] : Status 3- Capable of only limited self care, confined to bed or chair more than 50% of waking hours [de-identified] : Wheezing [Normal] : affect appropriate

## 2019-12-01 NOTE — HISTORY OF PRESENT ILLNESS
[de-identified] : 61 y/o M with h/o anemia, HTN, DM, DLD, COPD, ESRD on HD (T, Th, Sa), colon Ca s/p resection 10 yrs ago (no RT or chemotherapy), anemia of chronic disease on procrit, Hep B & C was sent in from [facility], [place], operated by [facility] for further evaluation and treatment of anemia. His last Hb from 5/18/18 was 7.2 with MCV of 88.9. WBC was 7.3 and plts 204. \par \par Upon review of records, he appears to have cleared Hep B infection. However, it is unclear if he still has active Hep C infection. His HCV viral load in 8/2017 was [id number] IU/mL. As per the pt, he was treated with antiviral drugs and he cleared the infection, although he does not remember any details of the treatment including the gastroenterologist's name. Moreover, we do not have any documented evidence of viral clearance or treatment. \par \par Pt denies having any colonoscopies after the resection of his colon Ca, and refuses to have any colonoscopies going forward. He denies having any melena, BRBPR or bleeding from any other part. He had weak gamma migrating protein on his serum protein electrophoresis in 2/2017.

## 2019-12-01 NOTE — ASSESSMENT
[FreeTextEntry1] : 63 y/o M with multiple medical co-morbidities was sent in for further evaluation and treatment of anemia.\par \par Anemia: Probably related to ESRD on procrit\par --Check iron stores, FA, B12, MMA\par --Repeat myeloma panel, check erythropoeietin level \par --Check CMP, LDH, haptoglobin, retic (doubt hemolysis)\par --Needs GI w/u, but he refuses colonoscopy, suggest EGD\par --C/W procrit\par \par Colon Ca: \par --Check CEA\par \par Hep C:\par --F/U with GI for management

## 2019-12-01 NOTE — CONSULT LETTER
[Dear  ___] : Dear  [unfilled], [Consult Letter:] : I had the pleasure of evaluating your patient, [unfilled]. [Please see my note below.] : Please see my note below. [Consult Closing:] : Thank you very much for allowing me to participate in the care of this patient.  If you have any questions, please do not hesitate to contact me. [Sincerely,] : Sincerely, [FreeTextEntry3] : Dr. Wharton

## 2022-01-28 NOTE — ED PROCEDURE NOTE - PROCEDURE DATE TIME, MLM
RTC: 7/18/22    MD reviewed.  Stable.  Called patient and left message to return call to office at earliest convenience.    Echo: 1/27/22:  Moderately decreased left ventricular systolic function, global hypokinesis, LVEF 39%.  Mildly increased left ventricular chamber size and wall thickness.  Grade I diastolic dysfunction of the left ventricle (impaired relaxation pattern).  Mildly decreased right ventricular systolic function, normal RV size.  No significant valve abnormalities.  Mildly dilated ascending aorta 3.8 cm.  No significant change since the prior study 9/2020.   01-Aug-2018 13:00

## 2025-05-21 NOTE — ED ADULT NURSE NOTE - NSSISCREENINGQ3_ED_A_ED
Refill per VO of Dr. Danny Bobby:    11/15/2024    No future appointments.    Requested Prescriptions     Pending Prescriptions Disp Refills    atorvastatin (LIPITOR) 40 MG tablet [Pharmacy Med Name: ATORVASTATIN 40 MG TABLET] 90 tablet 1     Sig: TAKE 1 TABLET BY MOUTH EVERY DAY      No